# Patient Record
Sex: FEMALE | Race: AMERICAN INDIAN OR ALASKA NATIVE | Employment: PART TIME | URBAN - METROPOLITAN AREA
[De-identification: names, ages, dates, MRNs, and addresses within clinical notes are randomized per-mention and may not be internally consistent; named-entity substitution may affect disease eponyms.]

---

## 2019-08-16 ENCOUNTER — TRANSFERRED RECORDS (OUTPATIENT)
Dept: HEALTH INFORMATION MANAGEMENT | Facility: CLINIC | Age: 24
End: 2019-08-16

## 2019-08-17 ENCOUNTER — HOSPITAL ENCOUNTER (INPATIENT)
Facility: HOSPITAL | Age: 24
LOS: 2 days | Discharge: HOME | DRG: 882 | End: 2019-08-19
Attending: PSYCHIATRY & NEUROLOGY | Admitting: PSYCHIATRY & NEUROLOGY
Payer: COMMERCIAL

## 2019-08-17 ENCOUNTER — HOSPITAL ENCOUNTER (EMERGENCY)
Facility: HOSPITAL | Age: 24
End: 2019-08-17

## 2019-08-17 DIAGNOSIS — F32.1 MAJOR DEPRESSIVE DISORDER, SINGLE EPISODE, MODERATE (H): Primary | ICD-10-CM

## 2019-08-17 PROBLEM — R45.89 SUICIDAL BEHAVIOR: Status: ACTIVE | Noted: 2019-08-17

## 2019-08-17 PROCEDURE — 99223 1ST HOSP IP/OBS HIGH 75: CPT | Performed by: NURSE PRACTITIONER

## 2019-08-17 PROCEDURE — 12400000 ZZH R&B MH

## 2019-08-17 RX ORDER — TRAZODONE HYDROCHLORIDE 50 MG/1
50 TABLET, FILM COATED ORAL
Status: DISCONTINUED | OUTPATIENT
Start: 2019-08-17 | End: 2019-08-19 | Stop reason: HOSPADM

## 2019-08-17 RX ORDER — HYDROXYZINE HYDROCHLORIDE 10 MG/1
30-50 TABLET, FILM COATED ORAL EVERY 4 HOURS PRN
Status: DISCONTINUED | OUTPATIENT
Start: 2019-08-17 | End: 2019-08-17

## 2019-08-17 RX ORDER — VENLAFAXINE HYDROCHLORIDE 37.5 MG/1
37.5 CAPSULE, EXTENDED RELEASE ORAL
Status: DISCONTINUED | OUTPATIENT
Start: 2019-08-18 | End: 2019-08-19 | Stop reason: HOSPADM

## 2019-08-17 RX ORDER — ALUMINA, MAGNESIA, AND SIMETHICONE 2400; 2400; 240 MG/30ML; MG/30ML; MG/30ML
30 SUSPENSION ORAL EVERY 4 HOURS PRN
Status: DISCONTINUED | OUTPATIENT
Start: 2019-08-17 | End: 2019-08-19 | Stop reason: HOSPADM

## 2019-08-17 RX ORDER — HYDROXYZINE HYDROCHLORIDE 25 MG/1
25-50 TABLET, FILM COATED ORAL EVERY 4 HOURS PRN
Status: DISCONTINUED | OUTPATIENT
Start: 2019-08-17 | End: 2019-08-19 | Stop reason: HOSPADM

## 2019-08-17 RX ORDER — ALBUTEROL SULFATE 90 UG/1
1-2 AEROSOL, METERED RESPIRATORY (INHALATION) EVERY 6 HOURS PRN
Status: DISCONTINUED | OUTPATIENT
Start: 2019-08-17 | End: 2019-08-19 | Stop reason: HOSPADM

## 2019-08-17 RX ORDER — BISACODYL 10 MG
10 SUPPOSITORY, RECTAL RECTAL DAILY PRN
Status: DISCONTINUED | OUTPATIENT
Start: 2019-08-17 | End: 2019-08-19 | Stop reason: HOSPADM

## 2019-08-17 RX ORDER — ALBUTEROL SULFATE 90 UG/1
1 AEROSOL, METERED RESPIRATORY (INHALATION) EVERY 4 HOURS PRN
COMMUNITY

## 2019-08-17 RX ORDER — ACETAMINOPHEN 325 MG/1
650 TABLET ORAL EVERY 4 HOURS PRN
Status: DISCONTINUED | OUTPATIENT
Start: 2019-08-17 | End: 2019-08-19 | Stop reason: HOSPADM

## 2019-08-17 RX ORDER — IBUPROFEN 600 MG/1
600 TABLET, FILM COATED ORAL EVERY 6 HOURS PRN
Status: DISCONTINUED | OUTPATIENT
Start: 2019-08-17 | End: 2019-08-19 | Stop reason: HOSPADM

## 2019-08-17 ASSESSMENT — ACTIVITIES OF DAILY LIVING (ADL)
RETIRED_EATING: 0-->INDEPENDENT
TRANSFERRING: 0-->INDEPENDENT
BATHING: 0-->INDEPENDENT
COGNITION: 0 - NO COGNITION ISSUES REPORTED
TOILETING: 0-->INDEPENDENT
RETIRED_COMMUNICATION: 0-->UNDERSTANDS/COMMUNICATES WITHOUT DIFFICULTY
SWALLOWING: 0-->SWALLOWS FOODS/LIQUIDS WITHOUT DIFFICULTY
AMBULATION: 0-->INDEPENDENT
DRESS: 0-->INDEPENDENT
FALL_HISTORY_WITHIN_LAST_SIX_MONTHS: NO

## 2019-08-17 ASSESSMENT — MIFFLIN-ST. JEOR: SCORE: 1528.36
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
## 2019-08-17 NOTE — PLAN OF CARE
"  Problem: Suicidal Behavior  Goal: Suicidal Behavior is Absent or Managed  8/17/2019 1815 by Kelley Melendez, RN  Outcome: Improving   Vitals  good.   Denies suicidal thoughts. States depression 5 or6. Guarded about custody of her son. States does understand why. Turns eyes away from staff. \"Maybe its better till I get our new house together.\" Isolates in her room . States not comfortable around others right now.    Face to face end of shift report communicated to night shift RN.     Kelley Melendez  8/18/2019  9:19 PM        "

## 2019-08-17 NOTE — PROGRESS NOTES
08/17/19 1306   Patient Belongings   Did you bring any home meds/supplements to the hospital?  No   Patient Belongings other (see comments);sent to security per site process  (put in assigned cubby in pt. belongings room)   Patient Belongings Put in Hospital Secure Location (Security or Locker, etc.) cell phone/electronics;keys   Belongings Search Yes   Clothing Search Yes   Second Staff Bernardo    Comment white sandals, black sports bra, floral top, black sweatshirt, omar paul, misc. paperwork   List items sent to safe: jag rehman w/ five keys and [place] ID, Iphone w/cracks in screen and glitter phone case     All other belongings put in assigned cubby in belongings room.     I have reviewed my belongings list on admission and verify that it is correct.     Patient signature_______________________________    Second staff witness (if patient unable to sign) ______________________________       I have received all my belongings at discharge.    Patient signature________________________________    Vicky BOYER  8/17/2019  1:17 PM

## 2019-08-17 NOTE — LETTER
[address] [address]  Phone: [phone]  Fax: [phone]        08/19/19      Bostonnelson Shabazz Usman  [address] [place] [id number]          To whom it may concern:       Boston Mary was under my care at [facility] from 8/17/19-8/19/19. Please excuse her from work on these dates. Thank you.        Sincerely,          Lolita Greene, CNP

## 2019-08-17 NOTE — PLAN OF CARE
"ADMISSION NOTE    Reason for admission: Depressed .  Safety concerns no.  Risk for or history of violence no.   Full skin assessment: completed intact    Patient arrived on unit from [facility]. accompanied by transport  on 8/17/2019 1118 AM   Status on arrival: Voluntary    B/P 121/70 Temp 97.7 Pulse 77 Respirations 16 O2 97%  Patient given tour of unit and Welcome to  unit papers given to patient, wanding completed, belongings inventoried, and admission assessment completed.   Patient's legal status on arrival is Voluntary. Appropriate legal rights discussed with and copy given to patient. Patient Bill of Rights discussed with and copy given to patient.   Patient denies SI, HI, and thoughts of self harm and contracts for safety while on unit.      Dorinda Ash  8/17/2019  12:26 PM    Pt arrived on unit at 1118 AM accompanied by security and transport. She participated in the admit process without incidence. She is admitted for depression and SA. Pt is cooperative, and tearful. She denies SI at this time and states that she does not know why she took the Ibuprofen the other night. She states I don't want to die. She is from the [facility] and was feeling overwhelmed, she had some alcohol and then ingested \"a handful of Ibuprofen\"  \"I don't  Know why I did this, because I don't want to die\" she stated that she just moved into her own home on Thursday and then her sister came and took her baby away. Then I started to drink and took the pills, I went to the ER and they checked me out then sent me back home.  I was not feeling safe so I went back to the ER.  She denies having previous psych admission, she has never been to treatment for substances. She does drink alcohol 2-3 times per week some days more than others, she at times drinks beer or other flavors and at times will drink Vodka. She denies drinking to excess where she blackout, and denies ever experiencing. Withdrawal. She denies having HI " "hallucinations at this time. She admits to depression and anxiety daily. She has a mom, relationship is melina, sister, dad, younger brother and she had a brother commit suicide in 2014 by hanging self. She reprots that she had found him. Pt tearful when trying to talk about this. She had lived with mom up unitl she moved into her house last Thursday. Pt graduated from high school, has some college, and is employed at a . She had a warrant for outstanding issues and is to complete her sentence by spending weekends in group home. She states that she may need a note for her employer. Pt has history of Asthma, and has allergy to Animal dander. Last menstrual period was last week and does not take BC. She shares custody with her babies father. She was cooperative with assessment, skin assessment completed skin is intact. She has tattoos and some skin blemishes no open sores. PTA completed. Voluntary rights signed. Pt agreeable to medications and being set up with a therapist.      Problem: Adult Behavioral Health Plan of Care  Goal: Patient-Specific Goal (Individualization)  Outcome: No Change     Problem: Adult Behavioral Health Plan of Care  Goal: Adheres to Safety Considerations for Self and Others  Outcome: No Change   no SI at this time states \" I don't know why I did that the  Other day\" \"I don't want to die\"   Problem: Adult Behavioral Health Plan of Care  Goal: Optimized Coping Skills in Response to Life Stressors  Outcome: No Change   pt encouraged to participate in unit programming  Problem: Adult Behavioral Health Plan of Care  Goal: Develops/Participates in Therapeutic [place] to Support Successful Transition  Outcome: No Change     Problem: Suicidal Behavior  Goal: Suicidal Behavior is Absent or Managed  Outcome: No Change   pt denies SI at this time     end of shift communicated to the next shift TAMMIE Ash  8/17/2019  3:15 PM  "

## 2019-08-18 PROCEDURE — 25000132 ZZH RX MED GY IP 250 OP 250 PS 637: Performed by: NURSE PRACTITIONER

## 2019-08-18 PROCEDURE — 99232 SBSQ HOSP IP/OBS MODERATE 35: CPT | Performed by: NURSE PRACTITIONER

## 2019-08-18 PROCEDURE — 12400000 ZZH R&B MH

## 2019-08-18 RX ADMIN — VENLAFAXINE HYDROCHLORIDE 37.5 MG: 37.5 CAPSULE, EXTENDED RELEASE ORAL at 09:35

## 2019-08-18 RX ADMIN — NICOTINE POLACRILEX 8 MG: 4 GUM, CHEWING ORAL at 10:59

## 2019-08-18 NOTE — PLAN OF CARE
Problem: Adult Behavioral Health Plan of Care  Goal: Patient-Specific Goal (Individualization)  Outcome: No Change   Guarded and isolative. Eye contact better. States plans to call residential tomorrow and see if there are more changes because she wasn't there this weekend. Also plans to look into out pt treatment. Out of room now making a phone call.  Problem: Suicidal Behavior  Goal: Suicidal Behavior is Absent or Managed  8/18/2019 1838 by Kelley Melendez, RN  Outcome: No Change   Denies suicidal thoughts. Some depression but does not elaborate. With her plans to [place] residential and maybe treatment made no mention of her son.     Face to face end of shift report communicated to night shift RN..     Kelley Melendez  8/18/2019  9:07 PM

## 2019-08-18 NOTE — PLAN OF CARE
Face to face end of shift report received from Shelia Castillo. Rounding completed. Patient observed in bed.     Dorinda Ash  8/18/2019  7:41 AM   Problem: Adult Behavioral Health Plan of Care  Goal: Adheres to Safety Considerations for Self and Others  Outcome: No Change   pt isolates in room, states that she gets high anxiety in social situations.  She did not eat breakfast because she was afraid to leave the room.  Writer did order her a late breakfast as he tray was sent back to the kitchen. She denies SI HI hallucinations and pain at this time. She admits to some depression and anxiety.  She becomes tearful when we talk about her son.  She becomes tearful when asked how alcohol has become such a problem in her life.  Writer inquired about what she hopes to get from being hospitalized.  She would like to have out patient treatment and   Problem: Adult Behavioral Health Plan of Care  Goal: Optimized Coping Skills in Response to Life Stressors  Outcome: No Change  Pt is encouraged to particpate in unit programming   Problem: Suicidal Behavior  Goal: Suicidal Behavior is Absent or Managed  8/18/2019 0740 by Dorinda Ash, RN  Outcome: Improving   pt denies SI at this time. States I don't want to die. I don't know why I did that

## 2019-08-18 NOTE — PROGRESS NOTES
"[facility]  Psychiatric Progress Note      Impression:   This is a 24 year old yo female who presented to the [facility] ED for evaluation of suicidal ideation.     Boston is resting in bed when I see her today. Starting Effexor XR this morning, denies having any questions or concerns. She reports feeling \"alright\" today. Has not attended any group programming, does state that she does not feel comfortable being in a group of other patients. Has been spending time reading and putting puzzles together in her room. Slept well last night. She denies any suicidal thoughts today. She notes that she is supposed to work tomorrow, she is encouraged to contact her employer. Will provide her with a note for work at discharge.    Educated regarding medication indications, risks, benefits, side effects, contraindications and possible interactions. Verbally expressed understanding.        DIagnoses:   Adjustment disorder with mixed anxiety and depressed mood  R/o MDD  Alcohol use disorder, unspecified      Attestation:  Patient has been seen and evaluated by me,  Lolita Greene NP          Interim History:   The patient's care was discussed with the treatment team and chart notes were reviewed.          Medications:     Current Facility-Administered Medications Ordered in Epic   Medication Dose Route Frequency Last Rate Last Dose     acetaminophen (TYLENOL) tablet 650 mg  650 mg Oral Q4H PRN         albuterol (PROAIR HFA/PROVENTIL HFA/VENTOLIN HFA) 108 (90 Base) MCG/ACT inhaler 1-2 puff  1-2 puff Inhalation Q6H PRN         alum & mag hydroxide-simethicone (MYLANTA ES/MAALOX  ES) suspension 30 mL  30 mL Oral Q4H PRN         bisacodyl (DULCOLAX) Suppository 10 mg  10 mg Rectal Daily PRN         hydrOXYzine (ATARAX) tablet 25-50 mg  25-50 mg Oral Q4H PRN         ibuprofen (ADVIL/MOTRIN) tablet 600 mg  600 mg Oral Q6H PRN         magnesium hydroxide (MILK OF MAGNESIA) suspension 30 mL  30 mL Oral At Bedtime PRN         " "nicotine polacrilex (NICORETTE) gum 4-8 mg  4-8 mg Buccal Q1H PRN   8 mg at 08/18/19 1059     traZODone (DESYREL) tablet 50 mg  50 mg Oral At Bedtime PRN         venlafaxine (EFFEXOR-XR) 24 hr capsule 37.5 mg  37.5 mg Oral Daily with breakfast   37.5 mg at 08/18/19 0935     No current Epic-ordered outpatient medications on file.          10 point ROS reviewed- denies any current concerns       Allergies:     Allergies   Allergen Reactions     Animal Dander             Psychiatric Examination:   /52   Pulse 76   Temp 97.6  F (36.4  C) (Tympanic)   Resp 12   Ht 1.702 m (5' 7\")   Wt 73.6 kg (162 lb 3.2 oz)   SpO2 98%   BMI 25.40 kg/m    Weight is 162 lbs 3.2 oz  Body mass index is 25.4 kg/m .    Appearance:  awake, alert, adequately groomed, dressed in hospital scrubs and appeared as age stated  Attitude:  cooperative, pleasant  Eye Contact:  good  Mood:  anxious and depressed  Affect:  mood congruent  Speech:  clear, coherent  Psychomotor Behavior:  no evidence of tardive dyskinesia, dystonia, or tics  Thought Process:  logical, linear and goal oriented  Associations:  no loose associations  Thought Content:  no evidence of suicidal ideation or homicidal ideation and no evidence of psychotic thought  Insight:  fair  Judgment:  fair  Oriented to:  time, person, and place  Attention Span and Concentration:  intact  Recent and Remote Memory:  intact  Fund of Knowledge: appropriate for education  Muscle Strength and Tone: normal  Gait and Station: Normal           Labs:   No results found for this or any previous visit (from the past 24 hour(s)).           Plan:   Continue Effexor XR, likely increase tomorrow  Consider Naltrexone or Campral    ELOS: 2-3 days, starting medication, set up follow-up appts    "

## 2019-08-19 VITALS
TEMPERATURE: 97.5 F | OXYGEN SATURATION: 98 % | DIASTOLIC BLOOD PRESSURE: 48 MMHG | BODY MASS INDEX: 25.46 KG/M2 | WEIGHT: 162.2 LBS | SYSTOLIC BLOOD PRESSURE: 100 MMHG | HEART RATE: 75 BPM | HEIGHT: 67 IN | RESPIRATION RATE: 12 BRPM

## 2019-08-19 PROCEDURE — 25000132 ZZH RX MED GY IP 250 OP 250 PS 637: Performed by: NURSE PRACTITIONER

## 2019-08-19 PROCEDURE — 99239 HOSP IP/OBS DSCHRG MGMT >30: CPT | Performed by: NURSE PRACTITIONER

## 2019-08-19 RX ORDER — VENLAFAXINE HYDROCHLORIDE 37.5 MG/1
37.5 CAPSULE, EXTENDED RELEASE ORAL
Qty: 30 CAPSULE | Refills: 0 | Status: SHIPPED | OUTPATIENT
Start: 2019-08-20

## 2019-08-19 RX ADMIN — VENLAFAXINE HYDROCHLORIDE 37.5 MG: 37.5 CAPSULE, EXTENDED RELEASE ORAL at 08:17

## 2019-08-19 NOTE — PLAN OF CARE
"Psychosocial Assessment not completed as pt is discharging today - did speak with pt to discuss follow up pt stated she just needed to have a primary care appointment scheduled as she does not have an established provider at this time - appointment set up by this writer. Pt stated she did not need any appointments arranged for therapy or a Rule 25 as pt states \"I know where to go for those\"     Spoke with [facility] to see if they would be able to accommodate picking pt up today - waiting on a call back. [facility] stated they do not have any available vans today. Taxi was approved.         "

## 2019-08-19 NOTE — DISCHARGE INSTRUCTIONS
Behavioral Discharge Planning and Instructions    Summary: Boston was admitted to  with increased mental health symptoms     Main Diagnosis: Adjustment disorder with mixed anxiety and depressed mood, R/o MDD, Alcohol use disorder, unspecified    Major Treatments, Procedures and Findings: Stabilize with medications, connect with community programs.    Symptoms to Report: feeling more aggressive, increased confusion, losing more sleep, mood getting worse or thoughts of suicide    Lifestyle Adjustment: Take all medications as prescribed, meet with doctor/ medication provider, out patient therapist, , and [facility] worker as scheduled. Abstain from alcohol or any unprescribed drugs.    Psychiatry Follow-up:     [facility]   PCP - establish care - Dr. Bruno - August 22nd @ 1:40   [address], [place]    Phone: [phone]  Fax: [phone]      Resources:   Crisis Intervention: 681.990.6843 or 916-523-3524 (TTY: 477.951.9952).  Call anytime for help.  National [place] on Mental Illness (www.[place].sharath.org): 608.977.6567 or 396-873-0310.  Alcoholics Anonymous (www.alcoholics-anonymous.org): Check your phone book for your local chapter.  Suicide Awareness Voices of Education (SAVE) (www.save.org): 323-110-WKKL (1585)  National Suicide Prevention Line (www.mentalhealthmn.org): 862-653-LSDT (2833)  Mental Health Consumer/Survivor Network of [place] (www.mhcsn.net): 994.502.7061 or 621-563-4350  Mental Health Association of [place] (www.mentalhealth.org): 673.417.1935 or 228-253-2424    General Medication Instructions:   See your medication sheet(s) for instructions.   Take all medicines as directed.  Make no changes unless your doctor suggests them.   Go to all your doctor visits.  Be sure to have all your required lab tests. This way, your medicines can be refilled on time.  Do not use any drugs not prescribed by your doctor.  Avoid alcohol.

## 2019-08-19 NOTE — DISCHARGE SUMMARY
"Psychiatric Discharge Summary    Boston Mary MRN# [id number]   Age: 24 year old YOB: 1995     Date of Admission:  8/17/2019  Date of Discharge:  8/19/2019  Admitting Physician:  Casey Garcia MD  Discharge Physician:  Lolita Greene CNP         Event Leading to Hospitalization and Hospital Stay   Admission: Boston Mary is a 24 year old single  female who presented to the [facility] ED for evaluation of suicidal ideation. She had been seen in the ED earlier in the day after admitting to drinking alcohol and took 7-10 Ibuprofen. She denied that this was a suicide attempt, was medically cleared, and plan was for her to report to long term to serve her weekend incarceration. However she returned to the ED later in the day reporting that she did not feel that she could keep herself safe. Agreed to voluntary admission to behavioral health for further evaluation.     Today Boston is easily engaged in conversation. She denies any suicidal thoughts today. When asked about the ingestion of Ibuprofen she states \"I don't think I was suicidal. I don't know what I was thinking\". She reports several recent stressors that have caused her to feel more overwhelmed and depressed. Her sister was apparently awarded temporary custody of her 16 month old son this week. When asked about this further she does state that it is related to her alcohol use. She also indicates that last weekend she started serving time in long term for alcohol related charges. She apparently will serve her 30 days on the weekends, reporting to long term on Fridays. She has only completed 3 days. She also apparently just moved out of her mother's home and into her own place. She states that all of this has been very overwhelming to deal with. She denies any past history of depression, though does report history of anxiety that she states was treated with Xanax. She does request to have this restarted, though this request was " declined given her current alcohol abuse and high risk of addiction. We do discuss starting an antidepressant to help with anxiety and mood instability, which she is agreeable to. Will start Effexor XR, she can utilize Hydroxyzine PRN for anxiety if needed. She is interested in therapy, though does not feel that she needs to attend any type pf chemical dependency treatment.      Hospital stay: Boston was admitted to the behavioral health unit as a voluntary patient. She was agreeable to start Effexor XR for treatment of depression and anxiety. Denied any side effects from this. Discussion was had about Naltrexone or Campral for alcohol abuse, she deferred use of either of these at this time. She does agree that her alcohol use needs to stop, as it has been the complicating factor to several of her recent stressors. She denied any suicidal thoughts throughout her hospital stay. She did not attend any of the group programming, stating that she felt uncomfortable in the group setting. She was interested in individual therapy when discharged. Will also ensure she has an appointment for medication management, requests that this be in [place]. She is requesting to discharge home today. States that she does not want to miss anymore work. Will provide her with a work note. She denies any suicidal ideation today. She will discharge to home this afternoon.          At time of discharge, there is no evidence that patient is in immediate danger of self or others.        DIagnoses:   Adjustment disorder with mixed anxiety and depressed mood  R/o MDD  Alcohol use disorder, unspecified         Labs:   Preadmission labs reviewed and in paper chart. Utox- negative. ETOH- negative. CBC, CMP are unremarkable.           Discharge Medications:     Current Discharge Medication List      START taking these medications    Details   venlafaxine (EFFEXOR-XR) 37.5 MG 24 hr capsule Take 1 capsule (37.5 mg) by mouth daily (with breakfast)  Qty:  30 capsule, Refills: 0    Associated Diagnoses: Major depressive disorder, single episode, moderate (H)         CONTINUE these medications which have NOT CHANGED    Details   albuterol (PROAIR HFA/PROVENTIL HFA/VENTOLIN HFA) 108 (90 Base) MCG/ACT inhaler Inhale 1 puff into the lungs every 4 hours as needed for shortness of breath / dyspnea or wheezing    Comments: Pharmacy may dispense brand covered by insurance (Proair, or proventil or ventolin or generic albuterol inhaler)             Justification for dual anti-psychotic use: not applicable       Psychiatric Examination:   Appearance:  awake, alert, adequately groomed and appeared as age stated  Attitude:  cooperative, pleasant  Eye Contact:  good  Mood:  better  Affect:  appropriate and in normal range  Speech:  clear, coherent  Psychomotor Behavior:  no evidence of tardive dyskinesia, dystonia, or tics  Thought Process:  logical, linear and goal oriented  Associations:  no loose associations  Thought Content:  no evidence of suicidal ideation or homicidal ideation and no evidence of psychotic thought  Insight:  fair  Judgment:  fair  Oriented to:  time, person, and place  Attention Span and Concentration:  intact  Recent and Remote Memory:  intact  Fund of Knowledge: appropriate for education  Muscle Strength and Tone: normal  Gait and Station: Normal  Perception: no perceptual disturbances noted       Discharge Plan:   Psychiatry Follow-up:     [facility]   PCP - establish care - Dr. Bruno - August 22nd @ 1:40pm   [address] [place] [address]    Phone: [phone]  Fax: [phone]      Attestation:  The patient has been seen and evaluated by me,  Lolita Greene NP         Discharge Services Provided:    40 minutes spent on discharge services, including:  Final examination of patient.  Review and discussion of Hospital stay.  Instructions for continued outpatient care/goals.  Preparation of discharge records.  Preparation of  medications refills and new prescriptions.

## 2019-08-19 NOTE — PLAN OF CARE
BEHAVIORAL TEAM DISCUSSION    Participants: Lolita Greene NP, Halie Celeste LICSW, Margot Driscoll LSW,  Anastasiya Buckley LSW, Jose Mark LSW, Tahira Izquierdo RN, Annmarie Camara RN, Eliza Ferguson RN, Mignon [facility], Lenora Apple OT, Kassi Adamson OT, Barbie Varela OT  Progress: minimal   Continued Stay Criteria/Rationale: most likely discharge today   Medical/Physical: none known at this time   Precautions:   Falls precaution?: No  Behavioral Orders   Procedures    Code 1 - Restrict to Unit    Routine Programming     As clinically indicated    Status 15     Every 15 minutes.     Plan: needs to be assessed by SW, most likely will discharge today   Rationale for change in precautions or plan: none     Active Problems:    Suicidal behavior      Current Facility-Administered Medications:     acetaminophen (TYLENOL) tablet 650 mg, 650 mg, Oral, Q4H PRN, Diana Olson NP    albuterol (PROAIR HFA/PROVENTIL HFA/VENTOLIN HFA) 108 (90 Base) MCG/ACT inhaler 1-2 puff, 1-2 puff, Inhalation, Q6H PRN, Lolita Greene NP    alum & mag hydroxide-simethicone (MYLANTA ES/MAALOX  ES) suspension 30 mL, 30 mL, Oral, Q4H PRN, Diana Olson NP    bisacodyl (DULCOLAX) Suppository 10 mg, 10 mg, Rectal, Daily PRN, Diana Olson NP    hydrOXYzine (ATARAX) tablet 25-50 mg, 25-50 mg, Oral, Q4H PRN, Lolita Greene NP    ibuprofen (ADVIL/MOTRIN) tablet 600 mg, 600 mg, Oral, Q6H PRN, Lolita Greene NP    magnesium hydroxide (MILK OF MAGNESIA) suspension 30 mL, 30 mL, Oral, At Bedtime PRN, Diana Olson NP    nicotine polacrilex (NICORETTE) gum 4-8 mg, 4-8 mg, Buccal, Q1H PRN, Diana Olson NP, 8 mg at 08/18/19 1059    traZODone (DESYREL) tablet 50 mg, 50 mg, Oral, At Bedtime PRN, Diana Olson NP    venlafaxine (EFFEXOR-XR) 24 hr capsule 37.5 mg, 37.5 mg, Oral, Daily with breakfast, Lolita Greene NP, 37.5 mg at 08/19/19 0817

## 2019-08-19 NOTE — PLAN OF CARE
Face to face end of shift report received from Shelia CHO. Rounding completed. Patient observed in bed.     Dorinda Ash  8/19/2019  7:47 AM   Problem: Adult Behavioral Health Plan of Care  Goal: Adheres to Safety Considerations for Self and Others  Outcome: No Change   pt continues to isolate in room she states that she get anxiety when she is around a lot of people.  She denies SI HI Hallucination, depression, anxiety and pain at this time.  She states that she would like to discharge as she wants to get her things in order. She states that she is willing to try a medication for the cravings, and she would like to have a rule 25 set up as well as a therapist.  She states that she would like a therapist in [place] not in [place]. She states that when she is discharged she will use [facility] [place].  She states that she will need transport home.   Problem: Adult Behavioral Health Plan of Care  Goal: Optimized Coping Skills in Response to Life Stressors  Outcome: No Change   pt is encouraged to participate in unit programming  Problem: Suicidal Behavior  Goal: Suicidal Behavior is Absent or Managed  8/19/2019 0747 by Dorinda Ash, RN  Outcome: Improving   denies SI at this time    Pt discharged back to home she is transferred by a taxi. [facility] was not able to come transport.      Discharge Note    Patient Discharged to home on 8/19/2019 2:15 PM via Taxi accompanied by .     Patient informed of discharge instructions in AVS. patient verbalizes understanding and denies having any questions pertaining to AVS. Patient stable at time of discharge. Patient denies SI, HI, and thoughts of self harm at time of discharge. All personal belongings returned to patient. Discharge prescriptions sent to [facility]  via electronic communication. Psych evaluation, history and physical, AVS, and discharge summary faxed to next level of care-  By unit  Unit [facility]. .     Dorinda Ash  8/19/2019  2:15  PM

## 2024-06-20 NOTE — PLAN OF CARE
6/20/2024      RE: Elisha Gao  [address] [facility] [id number]     Dear Colleague,    Thank you for referring your patient, Elisha Gao, to the [facility] SP[facility]IC[facility]. Please see a copy of my visit note below.      [facility]  SPORTS & ORTHOPEDIC CLINIC VISIT     Jun 20, 2024        ASSESSMENT & PLAN    72-year-old with ulnar-sided wrist pain that is multifactorial.  Acutely likely due to tenosynovitis demonstrated on MRI though has some evidence of discomfort and pain at the DRUJ and TFCC as well    Reviewed imaging and assessment with patient in detail  Provided with wrist brace to use as needed for comfort with activity  Strongly encouraged follow-up with hand therapy and referral was provided.  Given the current swelling and tenosynovitis present we will try a short course of anti-inflammatory such as diclofenac.  Prescription sent to the pharmacy.  We discussed indication for steroid injection and she will follow-up with us if she would like to pursue this option.    Jossue Hoang MD  [facility]IC[facility]    -----  Chief Complaint   Patient presents with    Right Wrist - Pain       SUBJECTIVE  Elisha Gao is a/an 72 year old female who is seen in consultation at the request of  Debi Pizarro M.D. for evaluation of  right wrist pain and swelling.     The patient is seen by themselves.  The patient is Right handed    Onset: 4 month(s) ago. Reports insidious onset without acute precipitating event.  Location of Pain: right dorsal wrist/ hand   Worsened by: pushing up out of chair, use of hands  Better with: NA   Treatments tried: ibuprofen  Associated symptoms: swelling    Orthopedic/Surgical history: NO  Social History/Occupation: Retired       REVIEW OF SYSTEMS:  Do you have fever, chills, weight loss? No  Do you have any vision problems? No  Do you have any chest pain or edema? No  Do you have any  Face to face end of shift report received from Kelley ROE RN. Rounding completed. Patient observed in bed, appears asleep, respirations observed.     Shelia Rubio  8/18/2019  11:43 PM      Problem: Adult Behavioral Health Plan of Care  Goal: Patient-Specific Goal (Individualization)  Description  Pt will deny suicidal thoughts before discharge.  Pt will report an improvement in her depression before discharge.  Pt will attend 50 % of groups and participate in unit milieu.  Pt will be agreeable to treatment team recommendations.   8/18/2019 2343 by Shelia Rubio  Outcome: Improving     Pt has been in bed with eyes closed and regular respirations observed all night. Will continue to monitor.    Problem: Suicidal Behavior  Goal: Suicidal Behavior is Absent or Managed  8/18/2019 2343 by Shelia Rubio  Outcome: Improving    Face to face end of shift report will be given to AM shift RN.     Shelia Rubio  8/18/2019  11:44 PM       shortness of breath or wheezing?  No  Do you have stomach problems? No  Do you have any numbness or focal weakness? No  Do you have diabetes? No  Do you have problems with bleeding or clotting? No  Do you have an rashes or other skin lesions? No    OBJECTIVE:  There were no vitals taken for this visit.     General  - alert, pleasant, no distress  CV  - normal radial pulse, cap refill brisk  Musculoskeletal -right wrist  - inspection: normal joint alignment, no obvious deformity, mild swelling over the dorsal ulnar wrist  - palpation: TTP over the TFCC and ulnar dorsal carpal area.  Also mild tenderness over the DRUJ.  No bony or soft tissue tenderness, no tenderness at the anatomical snuffbox  - ROM:  90 deg flexion   70 deg extension with pain   25 deg abduction   65 deg adduction with pain  - strength: 5/5  strength, 5/5 flexion, extension, pronation, supination, adduction, abduction  - special tests:  (-) Tinel's  (-) Finkelstein  (-) Phalen  (-) Manuel click test  (-) ulnar impaction  Neuro  - no sensory or motor deficit, grossly normal coordination, normal muscle tone  Skin  - no ecchymosis, erythema, warmth, or induration, no obvious rash        RADIOLOGY:    Three-view x-rays of the right hand are performed 2/20/2024 and reviewed independently demonstrating no acute fracture or dislocation.  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  See EMR for formal radiology report.    Reviewed MRI dated 6/16/2024 of the right wrist.  Per radiology report:  IMPRESSION:     1. Tenosynovitis of the fourth extensor compartment (extensor  communis).     2. Ulnar negative variance with distal radioulnar joint degenerative  change.  *  Tearing of the central disc of the triangle fibrocartilage.           Again, thank you for allowing me to participate in the care of your patient.      Sincerely,    Jossue Hoang MD